# Patient Record
Sex: MALE | Race: WHITE
[De-identification: names, ages, dates, MRNs, and addresses within clinical notes are randomized per-mention and may not be internally consistent; named-entity substitution may affect disease eponyms.]

---

## 2021-05-20 ENCOUNTER — HOSPITAL ENCOUNTER (OUTPATIENT)
Dept: GENERAL RADIOLOGY | Facility: CLINIC | Age: 66
Discharge: HOME | End: 2021-05-22
Payer: COMMERCIAL

## 2021-05-20 DIAGNOSIS — R05.9 COUGH: ICD-10-CM

## 2021-05-20 PROCEDURE — 71046 X-RAY EXAM CHEST 2 VIEWS: CPT

## 2023-09-26 LAB
C REACTIVE PROTEIN (MG/L) IN SER/PLAS: 0.25 MG/DL
CITRULLINE ANTIBODY, IGG: <1 U/ML
CREATINE KINASE (U/L) IN SER/PLAS: 91 U/L (ref 0–325)
RHEUMATOID FACTOR (IU/ML) IN SERUM OR PLASMA: <10 IU/ML (ref 0–15)
SEDIMENTATION RATE, ERYTHROCYTE: 20 MM/H (ref 0–20)

## 2023-09-27 LAB
ANA PATTERN: ABNORMAL
ANA TITER: ABNORMAL
ANTI-CENTROMERE: <0.2 AI
ANTI-CHROMATIN: <0.2 AI
ANTI-DNA (DS): 1 IU/ML
ANTI-JO-1 IGG: <0.2 AI
ANTI-NUCLEAR ANTIBODY (ANA): POSITIVE
ANTI-RIBOSOMAL P: <0.2 AI
ANTI-RNP: <0.2 AI
ANTI-SCL-70: <0.2 AI
ANTI-SM/RNP: <0.2 AI
ANTI-SM: <0.2 AI
ANTI-SSA: <0.2 AI
ANTI-SSB: <0.2 AI

## 2023-09-29 LAB
ANCA IFA PATTERN: NORMAL
ANCA IFA TITER: NORMAL
ANGIOTENSIN CONVERTING ENZYME: 30 U/L (ref 16–85)
MYELOPEROXIDASE (MPO) AB, IGG: 0 AU/ML (ref 0–19)
SERINE PROTEINASE 3 (PR3) AB, IGG: 10 AU/ML (ref 0–19)

## 2023-10-02 LAB
ASPERGILLUS FUMIGATUS #1 AB: NORMAL
ASPERGILLUS FUMIGATUS #6 AB: NORMAL
AUREOBASIDIUM PULLULANS AB: NORMAL
MICROPOLYSPORA FAENI AB: NORMAL
PIGEON SERUM AB: NORMAL

## 2023-10-24 ENCOUNTER — ANESTHESIA EVENT (OUTPATIENT)
Dept: GASTROENTEROLOGY | Facility: EXTERNAL LOCATION | Age: 68
End: 2023-10-24

## 2023-10-24 PROBLEM — J84.10: Status: ACTIVE | Noted: 2021-01-01

## 2023-10-24 PROBLEM — R13.19 ESOPHAGEAL DYSPHAGIA: Status: ACTIVE | Noted: 2023-10-24

## 2023-10-24 PROBLEM — R06.00 DYSPNEA: Status: ACTIVE | Noted: 2023-10-24

## 2023-10-24 PROBLEM — J84.9 INTERSTITIAL LUNG DISEASE (MULTI): Status: ACTIVE | Noted: 2023-10-24

## 2023-10-24 PROBLEM — R05.3 CHRONIC COUGH: Status: ACTIVE | Noted: 2023-10-24

## 2023-10-24 PROBLEM — K21.9 ACID REFLUX: Status: ACTIVE | Noted: 2023-10-24

## 2023-10-24 RX ORDER — NEOMYCIN SULFATE, POLYMYXIN B SULFATE AND DEXAMETHASONE 3.5; 10000; 1 MG/ML; [USP'U]/ML; MG/ML
SUSPENSION/ DROPS OPHTHALMIC
COMMUNITY
Start: 2023-02-20 | End: 2023-11-02 | Stop reason: ALTCHOICE

## 2023-10-24 RX ORDER — ALBUTEROL SULFATE 90 UG/1
AEROSOL, METERED RESPIRATORY (INHALATION)
COMMUNITY
Start: 2021-11-12 | End: 2023-11-02 | Stop reason: ALTCHOICE

## 2023-10-24 RX ORDER — OMEPRAZOLE 20 MG/1
20 CAPSULE, DELAYED RELEASE ORAL EVERY OTHER DAY
COMMUNITY

## 2023-10-24 RX ORDER — ROSUVASTATIN CALCIUM 5 MG/1
TABLET, COATED ORAL
COMMUNITY
Start: 2022-04-04

## 2023-10-24 RX ORDER — ATENOLOL 25 MG/1
TABLET ORAL
COMMUNITY
Start: 2022-04-04 | End: 2023-11-02 | Stop reason: ALTCHOICE

## 2023-10-24 NOTE — ANESTHESIA PREPROCEDURE EVALUATION
Patient: Arya Weiss    Procedure Information       Date/Time: 10/30/23 1010    Scheduled providers: Catalino Oden MD    Procedure: EGD    Location: [facility]            Relevant Problems   GI   (+) Acid reflux       Clinical information reviewed:                   NPO Detail:  No data recorded     PHYSICAL EXAM    Anesthesia Plan

## 2023-10-25 ENCOUNTER — ANCILLARY PROCEDURE (OUTPATIENT)
Dept: CARDIAC REHAB | Facility: HOSPITAL | Age: 68
End: 2023-10-25
Payer: MEDICARE

## 2023-10-25 ENCOUNTER — APPOINTMENT (OUTPATIENT)
Dept: CARDIOLOGY | Facility: HOSPITAL | Age: 68
End: 2023-10-25
Payer: MEDICARE

## 2023-10-25 DIAGNOSIS — R06.00 DYSPNEA: ICD-10-CM

## 2023-10-25 DIAGNOSIS — R06.02 SHORTNESS OF BREATH: ICD-10-CM

## 2023-10-25 DIAGNOSIS — J84.9 INTERSTITIAL PULMONARY DISEASE, UNSPECIFIED (MULTI): ICD-10-CM

## 2023-10-25 PROCEDURE — 94621 CARDIOPULM EXERCISE TESTING: CPT

## 2023-10-25 PROCEDURE — 94621 CARDIOPULM EXERCISE TESTING: CPT | Performed by: INTERNAL MEDICINE

## 2023-10-30 ENCOUNTER — APPOINTMENT (OUTPATIENT)
Dept: GASTROENTEROLOGY | Facility: EXTERNAL LOCATION | Age: 68
End: 2023-10-30
Payer: MEDICARE

## 2023-10-30 ENCOUNTER — ANESTHESIA (OUTPATIENT)
Dept: GASTROENTEROLOGY | Facility: EXTERNAL LOCATION | Age: 68
End: 2023-10-30

## 2023-10-30 RX ORDER — SODIUM CHLORIDE, SODIUM LACTATE, POTASSIUM CHLORIDE, CALCIUM CHLORIDE 600; 310; 30; 20 MG/100ML; MG/100ML; MG/100ML; MG/100ML
20 INJECTION, SOLUTION INTRAVENOUS CONTINUOUS
Status: CANCELLED | OUTPATIENT
Start: 2023-10-30

## 2023-11-02 VITALS — BODY MASS INDEX: 24.34 KG/M2 | HEIGHT: 70 IN | WEIGHT: 170 LBS

## 2023-11-02 RX ORDER — MULTIVITAMIN
1 TABLET ORAL
COMMUNITY

## 2023-11-03 ENCOUNTER — ANESTHESIA EVENT (OUTPATIENT)
Dept: GASTROENTEROLOGY | Facility: HOSPITAL | Age: 68
End: 2023-11-03
Payer: MEDICARE

## 2023-11-03 ENCOUNTER — ANESTHESIA (OUTPATIENT)
Dept: GASTROENTEROLOGY | Facility: HOSPITAL | Age: 68
End: 2023-11-03
Payer: MEDICARE

## 2023-11-03 ENCOUNTER — HOSPITAL ENCOUNTER (OUTPATIENT)
Dept: GASTROENTEROLOGY | Facility: HOSPITAL | Age: 68
Setting detail: OUTPATIENT SURGERY
Discharge: HOME | End: 2023-11-03
Payer: MEDICARE

## 2023-11-03 VITALS
BODY MASS INDEX: 25.88 KG/M2 | SYSTOLIC BLOOD PRESSURE: 119 MMHG | OXYGEN SATURATION: 99 % | RESPIRATION RATE: 17 BRPM | HEART RATE: 55 BPM | TEMPERATURE: 97.3 F | WEIGHT: 180.34 LBS | DIASTOLIC BLOOD PRESSURE: 75 MMHG

## 2023-11-03 DIAGNOSIS — R13.19 OTHER DYSPHAGIA: ICD-10-CM

## 2023-11-03 DIAGNOSIS — K21.9 GASTRO-ESOPHAGEAL REFLUX DISEASE WITHOUT ESOPHAGITIS: ICD-10-CM

## 2023-11-03 PROBLEM — E78.5 HYPERLIPIDEMIA: Status: ACTIVE | Noted: 2023-11-03

## 2023-11-03 PROCEDURE — 7100000010 HC PHASE TWO TIME - EACH INCREMENTAL 1 MINUTE

## 2023-11-03 PROCEDURE — 7100000009 HC PHASE TWO TIME - INITIAL BASE CHARGE

## 2023-11-03 PROCEDURE — 3700000001 HC GENERAL ANESTHESIA TIME - INITIAL BASE CHARGE

## 2023-11-03 PROCEDURE — 2500000004 HC RX 250 GENERAL PHARMACY W/ HCPCS (ALT 636 FOR OP/ED): Performed by: NURSE ANESTHETIST, CERTIFIED REGISTERED

## 2023-11-03 PROCEDURE — 2500000005 HC RX 250 GENERAL PHARMACY W/O HCPCS: Performed by: NURSE ANESTHETIST, CERTIFIED REGISTERED

## 2023-11-03 PROCEDURE — 2500000004 HC RX 250 GENERAL PHARMACY W/ HCPCS (ALT 636 FOR OP/ED): Performed by: STUDENT IN AN ORGANIZED HEALTH CARE EDUCATION/TRAINING PROGRAM

## 2023-11-03 PROCEDURE — 88305 TISSUE EXAM BY PATHOLOGIST: CPT | Mod: TC,ELYLAB | Performed by: STUDENT IN AN ORGANIZED HEALTH CARE EDUCATION/TRAINING PROGRAM

## 2023-11-03 PROCEDURE — 3700000002 HC GENERAL ANESTHESIA TIME - EACH INCREMENTAL 1 MINUTE

## 2023-11-03 PROCEDURE — 43251 EGD REMOVE LESION SNARE: CPT | Performed by: STUDENT IN AN ORGANIZED HEALTH CARE EDUCATION/TRAINING PROGRAM

## 2023-11-03 PROCEDURE — 88305 TISSUE EXAM BY PATHOLOGIST: CPT | Mod: TC,SUR,ELYLAB | Performed by: STUDENT IN AN ORGANIZED HEALTH CARE EDUCATION/TRAINING PROGRAM

## 2023-11-03 PROCEDURE — 88305 TISSUE EXAM BY PATHOLOGIST: CPT | Performed by: STUDENT IN AN ORGANIZED HEALTH CARE EDUCATION/TRAINING PROGRAM

## 2023-11-03 PROCEDURE — 43239 EGD BIOPSY SINGLE/MULTIPLE: CPT | Performed by: STUDENT IN AN ORGANIZED HEALTH CARE EDUCATION/TRAINING PROGRAM

## 2023-11-03 RX ORDER — LIDOCAINE HYDROCHLORIDE 20 MG/ML
INJECTION, SOLUTION EPIDURAL; INFILTRATION; INTRACAUDAL; PERINEURAL AS NEEDED
Status: DISCONTINUED | OUTPATIENT
Start: 2023-11-03 | End: 2023-11-03

## 2023-11-03 RX ORDER — SODIUM CHLORIDE, SODIUM LACTATE, POTASSIUM CHLORIDE, CALCIUM CHLORIDE 600; 310; 30; 20 MG/100ML; MG/100ML; MG/100ML; MG/100ML
50 INJECTION, SOLUTION INTRAVENOUS CONTINUOUS
Status: DISCONTINUED | OUTPATIENT
Start: 2023-11-03 | End: 2023-11-04 | Stop reason: HOSPADM

## 2023-11-03 RX ORDER — PROPOFOL 10 MG/ML
INJECTION, EMULSION INTRAVENOUS AS NEEDED
Status: DISCONTINUED | OUTPATIENT
Start: 2023-11-03 | End: 2023-11-03

## 2023-11-03 RX ADMIN — PROPOFOL 50 MG: 10 INJECTION, EMULSION INTRAVENOUS at 08:26

## 2023-11-03 RX ADMIN — PROPOFOL 50 MG: 10 INJECTION, EMULSION INTRAVENOUS at 08:22

## 2023-11-03 RX ADMIN — SODIUM CHLORIDE, POTASSIUM CHLORIDE, SODIUM LACTATE AND CALCIUM CHLORIDE 50 ML/HR: 600; 310; 30; 20 INJECTION, SOLUTION INTRAVENOUS at 07:41

## 2023-11-03 RX ADMIN — PROPOFOL 50 MG: 10 INJECTION, EMULSION INTRAVENOUS at 08:34

## 2023-11-03 RX ADMIN — PROPOFOL 50 MG: 10 INJECTION, EMULSION INTRAVENOUS at 08:31

## 2023-11-03 RX ADMIN — PROPOFOL 50 MG: 10 INJECTION, EMULSION INTRAVENOUS at 08:29

## 2023-11-03 RX ADMIN — LIDOCAINE HYDROCHLORIDE 60 MG: 20 INJECTION, SOLUTION EPIDURAL; INFILTRATION; INTRACAUDAL; PERINEURAL at 08:20

## 2023-11-03 RX ADMIN — PROPOFOL 50 MG: 10 INJECTION, EMULSION INTRAVENOUS at 08:36

## 2023-11-03 RX ADMIN — PROPOFOL 100 MG: 10 INJECTION, EMULSION INTRAVENOUS at 08:20

## 2023-11-03 SDOH — HEALTH STABILITY: MENTAL HEALTH: CURRENT SMOKER: 0

## 2023-11-03 ASSESSMENT — PAIN SCALES - GENERAL
PAINLEVEL_OUTOF10: 0 - NO PAIN
PAINLEVEL_OUTOF10: 0 - NO PAIN
PAIN_LEVEL: 1
PAINLEVEL_OUTOF10: 0 - NO PAIN

## 2023-11-03 ASSESSMENT — COLUMBIA-SUICIDE SEVERITY RATING SCALE - C-SSRS
6. HAVE YOU EVER DONE ANYTHING, STARTED TO DO ANYTHING, OR PREPARED TO DO ANYTHING TO END YOUR LIFE?: NO
1. IN THE PAST MONTH, HAVE YOU WISHED YOU WERE DEAD OR WISHED YOU COULD GO TO SLEEP AND NOT WAKE UP?: NO
2. HAVE YOU ACTUALLY HAD ANY THOUGHTS OF KILLING YOURSELF?: NO

## 2023-11-03 ASSESSMENT — PAIN - FUNCTIONAL ASSESSMENT
PAIN_FUNCTIONAL_ASSESSMENT: 0-10
PAIN_FUNCTIONAL_ASSESSMENT: 0-10

## 2023-11-03 NOTE — H&P
Outpatient Hospital Procedure H&P    Patient Profile-Procedures  Name Arya Weiss  Date of Birth 1955  MRN [id number]  Address   [address]   [place] [id number]   [place] [address]    Primary Phone Number [phone]  Secondary Phone Number    Bekah Youngblood    Procedure(s):  Colonoscopy  Primary contact name and number   Extended Emergency Contact Information  Primary Emergency Contact: Deyanira Shore  Address: [address] [place] [address] [place]  Mobile Phone: [phone]  Relation: Significant Other    General Health  Weight   Vitals:    11/03/23 0724   Weight: 81.8 kg (180 lb 5.4 oz)     BMI Body mass index is 25.88 kg/m².    Allergies  No Known Allergies    Past Medical History   Past Medical History:   Diagnosis Date    COVID-19 vaccine administered     GERD (gastroesophageal reflux disease)     Hyperlipidemia     Larynx disease     possible obstruction or growth, patients pulmonary function test showed a possible obstruction causing respiratory changes    PONV (postoperative nausea and vomiting)     Pulmonary fibrosis (CMS/HCC)     PVC's (premature ventricular contractions)     SOB (shortness of breath) on exertion        Provider assessment  Diagnosis: GERD, dysphagia.  Medication Reviewed - yes  Prior to Admission medications    Medication Sig Start Date End Date Taking? Authorizing Provider   multivitamin tablet Take 1 tablet by mouth.    Historical Provider, MD   omeprazole (PriLOSEC) 20 mg DR capsule Take 1 capsule (20 mg) by mouth every other day. Do not crush or chew.    Historical Provider, MD   rosuvastatin (Crestor) 5 mg tablet Rosuvastatin Calcium 5 MG Oral Tablet   Quantity: 90  Refills: 0        Start : 4-Apr-2022   Active 4/4/22   Historical Provider, MD   albuterol 90 mcg/actuation inhaler Albuterol Sulfate  (90 Base) MCG/ACT Inhalation Aerosol Solution   Quantity: 8  Refills: 0        Start : 12-Nov-2021   Active 11/12/21 11/2/23  Historical  Provider, MD   atenolol (Tenormin) 25 mg tablet Atenolol 25 MG Oral Tablet   Quantity: 45  Refills: 0        Start : 4-Apr-2022   Active 4/4/22 11/2/23  Historical Provider, MD   neomycin-polymyxin-dexAMETHasone (Maxitrol) 3.5mg/mL-10,000 unit/mL-0.1 % ophthalmic suspension  2/20/23 11/2/23  Historical Provider, MD       Physical Exam  Vitals:    11/03/23 0724   BP: 148/83   Pulse: 63   Resp: 18   Temp: 36.1 °C (97 °F)   SpO2: 97%        General: A&Ox3, NAD.  HEENT: AT/NC.   CV: RRR. No murmur.  Resp: CTA bilaterally. No wheezing, rhonchi or rales.   GI: Soft, NT/ND.  Extrem: No edema. Pulses intact.  Skin: No Jaundice.   Neuro: No focal deficits.   Psych: Normal mood and affect.      Sedation Plan: Deep Sedation.  Procedure Plan - pre-procedural (re)assesment completed by physician:  discharge/transfer patient when discharge criteria met    Kwan Quintana DO  11/3/2023 8:12 AM

## 2023-11-03 NOTE — NURSING NOTE
Huddle and Timeout completed together with team. Patient wristband and SHAMIR information verified.

## 2023-11-03 NOTE — ANESTHESIA PREPROCEDURE EVALUATION
"Arya Weiss is a 68 y.o. adult here for:    EGD  With Kwan Quintana, DO  Other dysphagia  Gastro-esophageal reflux disease without esophagitis    Relevant Problems   Cardiovascular   (+) Hyperlipidemia      GI   (+) Acid reflux       No results found for: \"HGB\", \"HCT\", \"WBC\", \"PLT\", \"GLU\", \"NA\", \"K\", \"CL\", \"CREATININE\", \"BUN\"    Stress test 9/2023:   Impression:      1. The peak VO2 achieved was 34.4 ml/kg/min which is consistent with Jung functional class A (mild- no exercise impairment). The peak VO2 achieved was 120% peak predicted VO2 based on age, gender, height nomogram.   2. There was a good exercise effort with peak RER 1.04 and peak  bpm (100% APMHR).   3. The ventilatory effiency slope (VE/VCO2) at peak exercise was mildly abnormal (32).   4. The cardiac response to exercise showed normal ECG tracings without coronary ischemia. The O2 pulse augmented appropriately from 6 ml/beat at rest to 18 ml/beat at peak exercise.   5. The pulmonary response to exercise showed breathing reserve 33% and normal oxygenation during exercise.   6. Spirometry: Pre-exercise: FVC 4.24 L (97% predicted), FEV1 3.44 L (103% pred), FEV1/FVC 81% (106% pred); Post-exercise: FVC 4.27 L (0% change from baseline), FEV1 3.49 L (+1 change), FEV1/FVC ratio 82%. No change in exercies spirometry.   7. The ventilatory threshold occurred at 98% peak VO2 (33.6 ml/kg/min). The peak VO2 at VT was approximately 9.5 METs indicating an above normal submaximal exertional thershold.   8. Conclusion: Supranormal exercise capacity with peak VO2 at 120% of peak predicted, 34.4 ml/kg/min.    TTE 8/2023:  CONCLUSIONS:  1. Left ventricular systolic function is normal with a 60-65% estimated ejection fraction.  2. There is no evidence of mitral valve stenosis.  3. No evidence of mitral valve regurgitation.  4. No evidence of tricuspid regurgitation.  5. Aortic valve stenosis is not present.  6. The main pulmonary artery is normal in size, " and position, with normal bifurcation into the left and right pulmonary arteries.    Social History     Tobacco Use   Smoking Status Not on file   Smokeless Tobacco Not on file       No Known Allergies    Current Outpatient Medications   Medication Instructions    multivitamin tablet 1 tablet, oral    omeprazole (PRILOSEC) 20 mg, oral, Every other day, Do not crush or chew.    rosuvastatin (Crestor) 5 mg tablet Rosuvastatin Calcium 5 MG Oral Tablet   Quantity: 90  Refills: 0        Start : 4-Apr-2022   Active       Past Surgical History:   Procedure Laterality Date    DENTAL IMPLANT      KNEE CARTILAGE SURGERY Right     LAMINECTOMY      L4-L5    SHOULDER SURGERY Left     TOE SURGERY Left     great toe    VASECTOMY         Family History   Family history unknown: Yes       NPO Details:  No data recorded    Physical Exam    Airway  Mallampati: II  TM distance: >3 FB  Neck ROM: full     Cardiovascular    Dental - normal exam     Pulmonary    Abdominal            Anesthesia Plan    ASA 2     MAC     The patient is not a current smoker.    intravenous induction   Anesthetic plan and risks discussed with patient.    Plan discussed with CRNA.

## 2023-11-03 NOTE — ANESTHESIA POSTPROCEDURE EVALUATION
Patient: Arya Weiss    Procedure Summary       Date: 11/03/23 Room / Location: [facility]    Anesthesia Start: 0814 Anesthesia Stop: 0842    Procedure: EGD Diagnosis:       Other dysphagia      Gastro-esophageal reflux disease without esophagitis    Scheduled Providers: Kwan Quintana DO; Hero Moulton MD Responsible Provider: Hero Moulton MD    Anesthesia Type: MAC ASA Status: 2            Anesthesia Type: MAC    Vitals Value Taken Time   BP 92/50 11/03/23 0843   Temp 36 11/03/23 0843   Pulse 60 11/03/23 0843   Resp 18 11/03/23 0843   SpO2 99 11/03/23 0843       Anesthesia Post Evaluation    Patient location during evaluation: bedside  Patient participation: complete - patient participated  Level of consciousness: awake  Pain score: 1  Pain management: adequate  Airway patency: patent  Cardiovascular status: acceptable  Respiratory status: acceptable  Hydration status: acceptable        There were no known notable events for this encounter.

## 2023-11-03 NOTE — NURSING NOTE
Patient tolerated procedure well. Appears comfortable with no complaints of pain. VS stable. Arousable prior to transport. Patient transported to [facility] via cart.  Report called per CRNA.  Handoff completed.

## 2023-11-03 NOTE — DISCHARGE INSTRUCTIONS
Upper GI Endoscopy Discharge Instructions    About this topic  This procedure is done to view your upper gastrointestinal (GI) tract. This includes your throat and food pipe (esophagus). It also includes your stomach and the first part of the small bowel. Some people have this test for problems like coughing or throwing up blood. Other people may be having bad belly pain or blood in their stool. You may be having trouble swallowing or problems with acid reflux.  Doctors often use this test to look for problems like:  Ulcers  Cancer or tumor growths  Internal bleeding  Swelling  Inflammation  Infection  Driscoll's esophagus  Gastroesophageal reflux disease or GERD  Swallowing problems    What care is needed at home?  Ask your doctor what you need to do when you go home. Make sure you ask questions if you do not understand what the doctor says. This way you will know what you need to do.  Your throat may feel sore. Your belly may also feel bloated. Your doctor may give you drugs to help with pain.  Talk to your doctor about when it is safe for you to go back to eating your normal diet and taking your drugs. You can drink fluid once the numbing drugs in your throat wear off.  What follow-up care is needed?  Your doctor may ask you to make visits to the office to check on your progress. Be sure to keep these visits.  The results of this test may help your doctor understand what kind of problem you have with your upper GI tract. Together you can make a plan for more care.  What drugs may be needed?  The doctor may order drugs to:  Help with pain  Decrease the acid in your stomach  Will physical activity be limited?  You may need to limit your activity for the rest of the day. After that, you will likely be able to go back to your normal activities.  What changes to diet are needed?  Soft foods like pudding or soups may be easier to eat at first. Ask your doctor if you need to make any changes to your diet.  What problems  could happen?  Painful swallowing  Upset stomach  Injury to food pipe  Throwing up  Tear in the esophagus  When do I need to call the doctor?  Signs of infection. These include a fever of 100.4°F (38°C) or higher, chills.  Very bad belly pain  Throwing up blood  Your belly is hard and swollen  Trouble swallowing or breathing  Upset stomach and throwing up  Bloody or black tarry stools  Cough, shortness of breath, or chest pain  A crunching feeling under the skin in your neck  You are not feeling better in 2 to 3 days or you are feeling worse  Teach Back: Helping You Understand  The Teach Back Method helps you understand the information we are giving you. After you talk with the staff, tell them in your own words what you learned. This helps to make sure the staff has described each thing clearly. It also helps to explain things that may have been confusing. Before going home, make sure you can do these:  I can tell you about my procedure.  I can tell you what changes I need to make with my diet or drugs.  I can tell you what I will do if I have very bad belly pain, throwing up blood, or my belly is hard and swollen.  Where can I learn more?  [employer]  https://www.gastro.org/practice-guidance/gi-patient-center/topic/upper-gi-endoscopy      Moderate Sedation in Adults Discharge Instructions    About this topic  Moderate sedation is also known as conscious sedation. It changes your state of being awake or consciousness. With this sedation, you may feel slight pain or pressure during a procedure. The drugs help you to relax and may even allow you to sleep. It will be easy to wake you and you may talk and answer questions while under sedation. Most likely, you will not remember what happens while under this sedation.  What care is needed at home?  Ask your doctor what you need to do when you go home. Make sure you understand everything the doctor says. This way you will know what you need  to do.  You will not be allowed to drive right away after the procedure. Ask a family member or a friend to drive you home.  Do not operate heavy or dangerous machinery for at least 24 hours.  Do not make major decisions or sign important papers for at least 24 hours. You may not be thinking clearly.  Avoid beer, wine, or mixed drinks (alcohol) for at least 24 hours.  You are at a higher risk of falling for at least 24 hours after moderate sedation.  Take extra care when you get up.  Do not change positions quickly.  Do not rush when you need to go to the bathroom or to answer the phone.  Ask for help if you feel unsteady when you try to walk.  Wear shoes with non-slip soles and low heels.  What follow-up care is needed?  Your doctor may ask you to make visits to the office to check on your progress. Be sure to keep these visits. Your doctor may also refer you to other doctors or tell you that you need more tests or care.  What drugs may be needed?  The doctor may order drugs to:  Help with pain  Treat an upset stomach or throwing up  Will physical activity be limited?  Rest for the day of the procedure. Avoid strenuous activities like heavy lifting and hard exercise. Talk to your doctor about whether you need to limit lifting or exercise after your procedure.  What changes to diet are needed?  Start with a light diet when you are fully awake. This includes things that are easy to swallow like soups, pudding, jello, toast, and eggs. Slowly progress to your normal diet.  What problems could happen?  Low blood pressure  Breathing problems  Upset stomach or throwing up  Dizziness  When do I need to call the doctor?  Feel dizzy, weak, or tired  Faint  Very bad headache  Upset stomach or throwing up  To follow up for more tests or care  Teach Back: Helping You Understand  The Teach Back Method helps you understand the information we are giving you. After you talk with the staff, tell them in your own words what you  learned. This helps to make sure the staff has described each thing clearly. It also helps to explain things that may have been confusing. Before going home, make sure you can do these:  I can tell you about my procedure.  I can tell you if I need more tests or care.  I can tell you what is good for me to eat and drink the next day.  I can tell you what I would do if I feel dizzy, weak, or tired.  Last Reviewed Date  2020-03-02

## 2023-11-15 LAB
LABORATORY COMMENT REPORT: NORMAL
PATH REPORT.FINAL DX SPEC: NORMAL
PATH REPORT.GROSS SPEC: NORMAL
PATH REPORT.TOTAL CANCER: NORMAL

## 2023-12-05 ENCOUNTER — HOSPITAL ENCOUNTER
Dept: HOSPITAL 101 - RAD | Age: 68
Discharge: HOME | End: 2023-12-05
Payer: MEDICARE

## 2023-12-05 DIAGNOSIS — M79.672: Primary | ICD-10-CM

## 2023-12-05 PROCEDURE — 73630 X-RAY EXAM OF FOOT: CPT

## 2023-12-05 NOTE — XR_ITS
The [address] [address] 
     [phone] 
  
  
Patient Name: 
NICOLE CHURCH 
  
MRN: [id number]    YOB: 1955    Sex: M 
Assigned Patient Location: RAD 
Current Patient Location:   
Accession/Order Number: [id number] 
Exam Date: 12/05/2023  13:45    Report Date: 12/06/2023  01:41 
  
At the request of: 
ABENA BONILLA   
  
Procedure:  XR foot LT min 3V 
  
PROCEDURE: XR foot LT min 3V 
  
HISTORY: LEFT FOOT PAIN , chronic 
  
COMPARISON: None. 
  
FINDINGS: 
BONES:No fracture, acute abnormality, or significant arthropathy.  
SOFT TISSUES:No visible soft tissue swelling.  
EFFUSION:None visible.  
OTHER: Negative.  
  
ORDER #: [id number] XR/XR foot LT min 3V  
IMPRESSION:  
   
1. No acute abnormality or significant degenerative changes.  
2. Minimal degenerative enthesopathic spurring of the calcaneus.  
   
   
Electronically authenticated by: AURA VICKERS   Date: 12/06/2023  01:41

## 2024-02-19 ENCOUNTER — DOCUMENTATION (OUTPATIENT)
Dept: PULMONOLOGY | Facility: HOSPITAL | Age: 69
End: 2024-02-19
Payer: MEDICARE

## 2024-02-19 ENCOUNTER — TELEPHONE (OUTPATIENT)
Dept: PULMONOLOGY | Facility: CLINIC | Age: 69
End: 2024-02-19
Payer: MEDICARE

## 2024-02-19 DIAGNOSIS — R05.3 CHRONIC COUGH: Primary | ICD-10-CM

## 2024-02-19 RX ORDER — PREDNISONE 10 MG/1
TABLET ORAL
Qty: 30 TABLET | Refills: 0 | Status: SHIPPED | OUTPATIENT
Start: 2024-02-19 | End: 2024-03-14 | Stop reason: WASHOUT

## 2024-02-19 NOTE — PROGRESS NOTES
Patient came down with COVID on January 24, 2024.  He was treated with Paxlovid.  He is having coughing particular at night.  He did see his primary physician outside of the  system and the chest x-ray was stable with a chronic scarring without pneumonia.  It is not unreasonable to put him on some prednisone and if no improvement he will come in to see me.  He also had received some amoxicillin.

## 2024-02-19 NOTE — TELEPHONE ENCOUNTER
Patient sts he still has some unresolved post COVID issues that he would like to discuss with the doctor. Please call

## 2024-02-24 ENCOUNTER — APPOINTMENT (OUTPATIENT)
Dept: RADIOLOGY | Facility: HOSPITAL | Age: 69
End: 2024-02-24
Payer: MEDICARE

## 2024-02-24 ENCOUNTER — HOSPITAL ENCOUNTER (EMERGENCY)
Facility: HOSPITAL | Age: 69
Discharge: HOME | End: 2024-02-25
Payer: MEDICARE

## 2024-02-24 ENCOUNTER — APPOINTMENT (OUTPATIENT)
Dept: CARDIOLOGY | Facility: HOSPITAL | Age: 69
End: 2024-02-24
Payer: MEDICARE

## 2024-02-24 DIAGNOSIS — J84.10 PULMONARY FIBROSIS (MULTI): ICD-10-CM

## 2024-02-24 DIAGNOSIS — R91.1 PULMONARY NODULE: ICD-10-CM

## 2024-02-24 DIAGNOSIS — R05.1 ACUTE COUGH: Primary | ICD-10-CM

## 2024-02-24 LAB
ALBUMIN SERPL BCP-MCNC: 4.1 G/DL (ref 3.4–5)
ALP SERPL-CCNC: 73 U/L (ref 33–136)
ALT SERPL W P-5'-P-CCNC: 22 U/L (ref 7–52)
ANION GAP SERPL CALC-SCNC: 15 MMOL/L (ref 10–20)
AST SERPL W P-5'-P-CCNC: 16 U/L (ref 9–39)
BASOPHILS # BLD AUTO: 0.02 X10*3/UL (ref 0–0.1)
BASOPHILS NFR BLD AUTO: 0.2 %
BILIRUB SERPL-MCNC: 0.3 MG/DL (ref 0–1.2)
BNP SERPL-MCNC: 16 PG/ML (ref 0–99)
BUN SERPL-MCNC: 14 MG/DL (ref 6–23)
CALCIUM SERPL-MCNC: 9.3 MG/DL (ref 8.6–10.3)
CHLORIDE SERPL-SCNC: 103 MMOL/L (ref 98–107)
CO2 SERPL-SCNC: 26 MMOL/L (ref 21–32)
CREAT SERPL-MCNC: 0.75 MG/DL (ref 0.5–1.3)
EGFRCR SERPLBLD CKD-EPI 2021: >90 ML/MIN/1.73M*2
EOSINOPHIL # BLD AUTO: 0.01 X10*3/UL (ref 0–0.7)
EOSINOPHIL NFR BLD AUTO: 0.1 %
ERYTHROCYTE [DISTWIDTH] IN BLOOD BY AUTOMATED COUNT: 12.5 % (ref 11.5–14.5)
GLUCOSE SERPL-MCNC: 96 MG/DL (ref 74–99)
HCT VFR BLD AUTO: 40.7 % (ref 36–52)
HGB BLD-MCNC: 13.9 G/DL (ref 12–17.5)
IMM GRANULOCYTES # BLD AUTO: 0.03 X10*3/UL (ref 0–0.7)
IMM GRANULOCYTES NFR BLD AUTO: 0.3 % (ref 0–0.9)
LYMPHOCYTES # BLD AUTO: 1.27 X10*3/UL (ref 1.2–4.8)
LYMPHOCYTES NFR BLD AUTO: 12.7 %
MCH RBC QN AUTO: 30.7 PG (ref 26–34)
MCHC RBC AUTO-ENTMCNC: 34.2 G/DL (ref 32–36)
MCV RBC AUTO: 90 FL (ref 80–100)
MONOCYTES # BLD AUTO: 0.68 X10*3/UL (ref 0.1–1)
MONOCYTES NFR BLD AUTO: 6.8 %
NEUTROPHILS # BLD AUTO: 8 X10*3/UL (ref 1.2–7.7)
NEUTROPHILS NFR BLD AUTO: 79.9 %
NRBC BLD-RTO: 0 /100 WBCS (ref 0–0)
PLATELET # BLD AUTO: 340 X10*3/UL (ref 150–450)
POTASSIUM SERPL-SCNC: 4 MMOL/L (ref 3.5–5.3)
PROT SERPL-MCNC: 7.2 G/DL (ref 6.4–8.2)
RBC # BLD AUTO: 4.53 X10*6/UL (ref 4–5.9)
SODIUM SERPL-SCNC: 140 MMOL/L (ref 136–145)
WBC # BLD AUTO: 10 X10*3/UL (ref 4.4–11.3)

## 2024-02-24 PROCEDURE — 36415 COLL VENOUS BLD VENIPUNCTURE: CPT | Performed by: PHYSICIAN ASSISTANT

## 2024-02-24 PROCEDURE — 99285 EMERGENCY DEPT VISIT HI MDM: CPT | Mod: 25

## 2024-02-24 PROCEDURE — 93005 ELECTROCARDIOGRAM TRACING: CPT

## 2024-02-24 PROCEDURE — 2500000004 HC RX 250 GENERAL PHARMACY W/ HCPCS (ALT 636 FOR OP/ED): Performed by: PHYSICIAN ASSISTANT

## 2024-02-24 PROCEDURE — 80053 COMPREHEN METABOLIC PANEL: CPT | Performed by: PHYSICIAN ASSISTANT

## 2024-02-24 PROCEDURE — 96361 HYDRATE IV INFUSION ADD-ON: CPT

## 2024-02-24 PROCEDURE — 71275 CT ANGIOGRAPHY CHEST: CPT

## 2024-02-24 PROCEDURE — 96374 THER/PROPH/DIAG INJ IV PUSH: CPT

## 2024-02-24 PROCEDURE — 2550000001 HC RX 255 CONTRASTS: Performed by: PHYSICIAN ASSISTANT

## 2024-02-24 PROCEDURE — 85025 COMPLETE CBC W/AUTO DIFF WBC: CPT | Performed by: PHYSICIAN ASSISTANT

## 2024-02-24 PROCEDURE — 71275 CT ANGIOGRAPHY CHEST: CPT | Performed by: RADIOLOGY

## 2024-02-24 PROCEDURE — 83880 ASSAY OF NATRIURETIC PEPTIDE: CPT | Performed by: PHYSICIAN ASSISTANT

## 2024-02-24 RX ORDER — IPRATROPIUM BROMIDE AND ALBUTEROL SULFATE 2.5; .5 MG/3ML; MG/3ML
3 SOLUTION RESPIRATORY (INHALATION) EVERY 20 MIN
Status: ACTIVE | OUTPATIENT
Start: 2024-02-24 | End: 2024-02-24

## 2024-02-24 RX ORDER — MAGNESIUM SULFATE HEPTAHYDRATE 40 MG/ML
2 INJECTION, SOLUTION INTRAVENOUS ONCE
Status: COMPLETED | OUTPATIENT
Start: 2024-02-24 | End: 2024-02-24

## 2024-02-24 RX ADMIN — IOHEXOL 75 ML: 350 INJECTION, SOLUTION INTRAVENOUS at 23:26

## 2024-02-24 RX ADMIN — SODIUM CHLORIDE, POTASSIUM CHLORIDE, SODIUM LACTATE AND CALCIUM CHLORIDE 1000 ML: 600; 310; 30; 20 INJECTION, SOLUTION INTRAVENOUS at 22:07

## 2024-02-24 RX ADMIN — METHYLPREDNISOLONE SODIUM SUCCINATE 125 MG: 125 INJECTION, POWDER, FOR SOLUTION INTRAMUSCULAR; INTRAVENOUS at 22:11

## 2024-02-24 RX ADMIN — MAGNESIUM SULFATE HEPTAHYDRATE 2 G: 40 INJECTION, SOLUTION INTRAVENOUS at 22:11

## 2024-02-24 ASSESSMENT — COLUMBIA-SUICIDE SEVERITY RATING SCALE - C-SSRS
2. HAVE YOU ACTUALLY HAD ANY THOUGHTS OF KILLING YOURSELF?: NO
1. IN THE PAST MONTH, HAVE YOU WISHED YOU WERE DEAD OR WISHED YOU COULD GO TO SLEEP AND NOT WAKE UP?: NO
6. HAVE YOU EVER DONE ANYTHING, STARTED TO DO ANYTHING, OR PREPARED TO DO ANYTHING TO END YOUR LIFE?: NO

## 2024-02-24 ASSESSMENT — PAIN DESCRIPTION - ONSET: ONSET: ONGOING

## 2024-02-24 ASSESSMENT — PAIN DESCRIPTION - PAIN TYPE: TYPE: ACUTE PAIN

## 2024-02-24 ASSESSMENT — PAIN SCALES - GENERAL: PAINLEVEL_OUTOF10: 2

## 2024-02-24 ASSESSMENT — LIFESTYLE VARIABLES
HAVE PEOPLE ANNOYED YOU BY CRITICIZING YOUR DRINKING: NO
HAVE YOU EVER FELT YOU SHOULD CUT DOWN ON YOUR DRINKING: NO
EVER HAD A DRINK FIRST THING IN THE MORNING TO STEADY YOUR NERVES TO GET RID OF A HANGOVER: NO
EVER FELT BAD OR GUILTY ABOUT YOUR DRINKING: NO

## 2024-02-24 ASSESSMENT — PAIN DESCRIPTION - PROGRESSION: CLINICAL_PROGRESSION: NOT CHANGED

## 2024-02-24 ASSESSMENT — PAIN DESCRIPTION - FREQUENCY: FREQUENCY: CONSTANT/CONTINUOUS

## 2024-02-24 ASSESSMENT — PAIN - FUNCTIONAL ASSESSMENT: PAIN_FUNCTIONAL_ASSESSMENT: 0-10

## 2024-02-24 ASSESSMENT — PAIN DESCRIPTION - LOCATION: LOCATION: CHEST

## 2024-02-24 ASSESSMENT — PAIN DESCRIPTION - DESCRIPTORS: DESCRIPTORS: TIGHTNESS;DISCOMFORT

## 2024-02-25 VITALS
TEMPERATURE: 97.7 F | SYSTOLIC BLOOD PRESSURE: 150 MMHG | RESPIRATION RATE: 18 BRPM | HEART RATE: 75 BPM | HEIGHT: 70 IN | OXYGEN SATURATION: 96 % | BODY MASS INDEX: 25.77 KG/M2 | DIASTOLIC BLOOD PRESSURE: 74 MMHG | WEIGHT: 180 LBS

## 2024-02-25 LAB
ATRIAL RATE: 74 BPM
P AXIS: 36 DEGREES
P OFFSET: 189 MS
P ONSET: 133 MS
PR INTERVAL: 166 MS
Q ONSET: 216 MS
QRS COUNT: 12 BEATS
QRS DURATION: 92 MS
QT INTERVAL: 390 MS
QTC CALCULATION(BAZETT): 432 MS
QTC FREDERICIA: 418 MS
R AXIS: 40 DEGREES
T AXIS: 11 DEGREES
T OFFSET: 411 MS
VENTRICULAR RATE: 74 BPM

## 2024-02-25 PROCEDURE — 2500000002 HC RX 250 W HCPCS SELF ADMINISTERED DRUGS (ALT 637 FOR MEDICARE OP, ALT 636 FOR OP/ED): Mod: MUE | Performed by: PHYSICIAN ASSISTANT

## 2024-02-25 PROCEDURE — 94640 AIRWAY INHALATION TREATMENT: CPT | Mod: MUE

## 2024-02-25 RX ADMIN — IPRATROPIUM BROMIDE AND ALBUTEROL SULFATE 3 ML: 2.5; .5 SOLUTION RESPIRATORY (INHALATION) at 00:08

## 2024-02-25 RX ADMIN — IPRATROPIUM BROMIDE AND ALBUTEROL SULFATE 3 ML: 2.5; .5 SOLUTION RESPIRATORY (INHALATION) at 00:04

## 2024-02-25 NOTE — ED PROVIDER NOTES
HPI   Chief Complaint   Patient presents with   • Cough     Pt was diagnosed with COVID a month ago and has been having continuous symptoms since. Pt has a persistent cough and feels short of breath at times.        This is a pleasant 68-year-old gentleman with a history of pulmonary fibrosis who presents to the emergency department chief complaint of persistent cough with dyspnea.  Patient states he was diagnosed with COVID a month ago and was treated with Paxlovid.  He developed persistent dry cough most notable at night.  He was seen by his doctor 2 weeks ago was placed on amoxicillin.  Patient reports that he was also placed on prednisone and his symptoms have not really improved.  He notes for the past few days the cough has been getting more progressive keeping him up at night.  He has been using his inhaler as well as his nebulizer.  He does see a pulmonologist.  Patient denies hemoptysis, fever, chills, night sweats, diaphoresis, back pain, abdominal pain, nausea vomiting or diarrhea.  He has a past medical history of pulmonary fibrosis, GERD, chronic cough, esophageal dysphagia, interstitial lung disease, hyperlipidemia.      History provided by:  Patient, spouse and medical records                      No data recorded                   Patient History   Past Medical History:   Diagnosis Date   • COVID-19 vaccine administered    • GERD (gastroesophageal reflux disease)    • Hyperlipidemia    • Larynx disease     possible obstruction or growth, patients pulmonary function test showed a possible obstruction causing respiratory changes   • PONV (postoperative nausea and vomiting)    • Pulmonary fibrosis (CMS/HCC)    • PVC's (premature ventricular contractions)    • SOB (shortness of breath) on exertion      Past Surgical History:   Procedure Laterality Date   • DENTAL IMPLANT     • KNEE CARTILAGE SURGERY Right    • LAMINECTOMY      L4-L5   • SHOULDER SURGERY Left    • TOE SURGERY Left     great toe   •  VASECTOMY       Family History   Family history unknown: Yes     Social History     Tobacco Use   • Smoking status: Never     Passive exposure: Never   • Smokeless tobacco: Never   Substance Use Topics   • Alcohol use: Not on file   • Drug use: Not on file       Physical Exam   ED Triage Vitals [02/24/24 1923]   Temperature Heart Rate Respirations BP   36.4 °C (97.5 °F) 81 20 164/85      Pulse Ox Temp Source Heart Rate Source Patient Position   95 % Temporal Monitor Sitting      BP Location FiO2 (%)     Right arm --       Physical Exam  Vitals and nursing note reviewed.   Constitutional:       Appearance: Normal appearance. Arya Weiss is normal weight.   HENT:      Head: Normocephalic and atraumatic.      Right Ear: Tympanic membrane, ear canal and external ear normal.      Left Ear: Tympanic membrane, ear canal and external ear normal.      Nose: Nose normal.      Mouth/Throat:      Mouth: Mucous membranes are moist.      Pharynx: Oropharynx is clear.   Eyes:      Extraocular Movements: Extraocular movements intact.      Conjunctiva/sclera: Conjunctivae normal.      Pupils: Pupils are equal, round, and reactive to light.   Cardiovascular:      Rate and Rhythm: Normal rate and regular rhythm.      Pulses: Normal pulses.      Heart sounds: Normal heart sounds.   Pulmonary:      Effort: Pulmonary effort is normal.      Breath sounds: Normal air entry. Examination of the right-upper field reveals decreased breath sounds and wheezing. Examination of the left-upper field reveals decreased breath sounds and wheezing. Examination of the right-middle field reveals decreased breath sounds and wheezing. Examination of the left-middle field reveals decreased breath sounds and wheezing. Examination of the right-lower field reveals decreased breath sounds and wheezing. Examination of the left-lower field reveals decreased breath sounds and wheezing. Decreased breath sounds and wheezing present. No rhonchi or rales.    Abdominal:      General: Abdomen is flat. Bowel sounds are normal.      Palpations: Abdomen is soft. There is no mass.      Tenderness: There is no abdominal tenderness. There is no guarding.   Musculoskeletal:         General: No swelling or tenderness. Normal range of motion.      Cervical back: Normal range of motion and neck supple.   Skin:     General: Skin is warm and dry.      Capillary Refill: Capillary refill takes less than 2 seconds.      Findings: No rash.   Neurological:      General: No focal deficit present.      Mental Status: Arya Weiss is alert and oriented to person, place, and time. Mental status is at baseline.   Psychiatric:         Mood and Affect: Mood normal.         Behavior: Behavior normal.         Thought Content: Thought content normal.         Judgment: Judgment normal.         ED Course & MDM   Diagnoses as of 02/25/24 0123   Acute cough   Pulmonary fibrosis (CMS/HCC)   Pulmonary nodule       Medical Decision Making  ED course Temp 36.4, heart rate 81, respirations 20, BP is 164/85, pulse ox was 95% on room air weight 81.6 kg  I discussed the workup plan with the patient which includes lab work, CT scan of the chest.  He was given a liter of LR wide open, 3 albuterol aerosol treatments, Solu-Medrol 120 mg IV push 2 g magnesium sulfate IV piggyback.    EKG shows normal sinus rhythm rate 74 NM was 166 QRS is 92  QTc was 432 no ischemic changes  Patient's lab work was reviewed CBC white count 10 hemoglobin 13 point hematocrit 40.7 platelet count was 340, there was a neutrophils in his differential his comprehensive metabolic panel is unremarkable his BNP was 16, CT scan of the chest shows no evidence of PE, he has bilateral groundglass opacities which may be secondary to atelectasis or pneumonia, redemonstration of changes of interstitial lung disease mild bronchiectasis, there is also a 4 mm pleural-based nodule to the left lower lobe and a 7 mm nodule density in the right  major fissure unchanged from previous imaging and evidence of cardiomegaly.  No evidence of pleural effusion or pneumothorax.  Examination patient states he is feeling much better after the aerosol treatments steroids and magnesium.  Repeat exam is wheezing has improved.  I discussed results of the workup.  At this point time he defers any further medications or prescriptions and will follow-up with his pulmonologist this week.  He was encouraged to return with any concerns or worsening of symptoms all questions answered prior to discharge        Procedure  Procedures     Edwin Guo PA-C  02/25/24 0123

## 2024-03-13 ENCOUNTER — TELEPHONE (OUTPATIENT)
Dept: PULMONOLOGY | Facility: CLINIC | Age: 69
End: 2024-03-13
Payer: MEDICARE

## 2024-03-13 ENCOUNTER — DOCUMENTATION (OUTPATIENT)
Dept: PULMONOLOGY | Facility: HOSPITAL | Age: 69
End: 2024-03-13
Payer: MEDICARE

## 2024-03-13 NOTE — PROGRESS NOTES
Patient developed COVID several months ago with increasing coughing congestion or fever.  He was not hospitalized.  Because of increasing coughing congestion he went to the emergency room the end of February and the CT scan revealed groundglass changes at the bases.  These changes are new compared to his HRCT scan last August.  I suspect that he developed COVID-pneumonia.  He has been treated with varying doses of antibiotics and corticosteroids.  He was a started on 60 mg of prednisone around 3 days ago and is tapering it.  He is on doxycycline.  I told him to stay on the 40 mg a day until he sees me the first part of next week.  He will continue with albuterol and monitor his oxygen level.

## 2024-03-13 NOTE — TELEPHONE ENCOUNTER
Patient sts he has been sick for the past 7 weeks and is requesting an appt. At this point I don't have anywhere else to squeeze anyone.    Any suggestions?

## 2024-03-14 ENCOUNTER — TELEPHONE (OUTPATIENT)
Dept: PULMONOLOGY | Facility: CLINIC | Age: 69
End: 2024-03-14
Payer: MEDICARE

## 2024-03-14 DIAGNOSIS — J84.10 PULMONARY FIBROSIS DETERMINED BY HIGH RESOLUTION COMPUTED TOMOGRAPHY (MULTI): ICD-10-CM

## 2024-03-14 DIAGNOSIS — R05.3 CHRONIC COUGH: Primary | ICD-10-CM

## 2024-03-14 DIAGNOSIS — U09.9 POST COVID-19 CONDITION, UNSPECIFIED: ICD-10-CM

## 2024-03-14 RX ORDER — PREDNISONE 20 MG/1
TABLET ORAL
Qty: 6 TABLET | Refills: 0 | Status: SHIPPED | OUTPATIENT
Start: 2024-03-14 | End: 2024-03-19 | Stop reason: WASHOUT

## 2024-03-14 NOTE — TELEPHONE ENCOUNTER
Mr. Weiss is requesting a full script for prednisone or just 6 more pills. States he do not have enough to get him through until his appointment on Tuesday 3/19.  Can be sent to [facility] pharmacy on file.

## 2024-03-19 ENCOUNTER — OFFICE VISIT (OUTPATIENT)
Dept: PULMONOLOGY | Facility: CLINIC | Age: 69
End: 2024-03-19
Payer: MEDICARE

## 2024-03-19 ENCOUNTER — TELEPHONE (OUTPATIENT)
Dept: PULMONOLOGY | Facility: CLINIC | Age: 69
End: 2024-03-19

## 2024-03-19 VITALS
HEART RATE: 62 BPM | RESPIRATION RATE: 18 BRPM | DIASTOLIC BLOOD PRESSURE: 82 MMHG | TEMPERATURE: 97.7 F | OXYGEN SATURATION: 96 % | WEIGHT: 187.8 LBS | BODY MASS INDEX: 26.95 KG/M2 | SYSTOLIC BLOOD PRESSURE: 141 MMHG

## 2024-03-19 DIAGNOSIS — J84.10 PULMONARY FIBROSIS DETERMINED BY HIGH RESOLUTION COMPUTED TOMOGRAPHY (MULTI): ICD-10-CM

## 2024-03-19 DIAGNOSIS — U09.9 POST COVID-19 CONDITION, UNSPECIFIED: ICD-10-CM

## 2024-03-19 DIAGNOSIS — R05.3 CHRONIC COUGH: Primary | ICD-10-CM

## 2024-03-19 PROCEDURE — 99214 OFFICE O/P EST MOD 30 MIN: CPT | Performed by: INTERNAL MEDICINE

## 2024-03-19 PROCEDURE — 1036F TOBACCO NON-USER: CPT | Performed by: INTERNAL MEDICINE

## 2024-03-19 PROCEDURE — 1159F MED LIST DOCD IN RCRD: CPT | Performed by: INTERNAL MEDICINE

## 2024-03-19 PROCEDURE — 1160F RVW MEDS BY RX/DR IN RCRD: CPT | Performed by: INTERNAL MEDICINE

## 2024-03-19 RX ORDER — PREDNISONE 20 MG/1
TABLET ORAL
Qty: 60 TABLET | Refills: 1 | Status: SHIPPED | OUTPATIENT
Start: 2024-03-19

## 2024-03-19 RX ORDER — ALBUTEROL SULFATE 0.83 MG/ML
2.5 SOLUTION RESPIRATORY (INHALATION) EVERY 6 HOURS PRN
Qty: 300 ML | Refills: 3 | Status: SHIPPED | OUTPATIENT
Start: 2024-03-19 | End: 2025-03-19

## 2024-03-19 RX ORDER — PREDNISONE 20 MG/1
TABLET ORAL
Qty: 60 TABLET | Refills: 0 | Status: SHIPPED | OUTPATIENT
Start: 2024-03-19

## 2024-03-19 ASSESSMENT — ENCOUNTER SYMPTOMS
ABDOMINAL DISTENTION: 0
CHOKING: 1
FACIAL SWELLING: 0
RHINORRHEA: 0
EYE DISCHARGE: 0
FREQUENCY: 0
NERVOUS/ANXIOUS: 0
SINUS PAIN: 0
NUMBNESS: 0
FEVER: 0
COUGH: 1
NAUSEA: 0
JOINT SWELLING: 0
BRUISES/BLEEDS EASILY: 0
FATIGUE: 0
APNEA: 0
UNEXPECTED WEIGHT CHANGE: 0
CONSTIPATION: 0
LIGHT-HEADEDNESS: 0
AGITATION: 0
STRIDOR: 0
HEADACHES: 0
SPEECH DIFFICULTY: 0
SHORTNESS OF BREATH: 1
WHEEZING: 0
SINUS PRESSURE: 0
DYSURIA: 0
HEMATURIA: 0
EYE REDNESS: 0
ABDOMINAL PAIN: 0
WEAKNESS: 0
DIZZINESS: 0
SLEEP DISTURBANCE: 0
PALPITATIONS: 0
DIFFICULTY URINATING: 0
TREMORS: 0
ADENOPATHY: 0
ARTHRALGIAS: 0

## 2024-03-19 NOTE — PROGRESS NOTES
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  His course has been complicated over the last several months with COVID and probable pneumonia with groundglass changes and severe coughing.  He has required higher dose prednisone which was reinitiated last week and currently is on 40 mg and is doing better.  His saturation in the office is 96% and his lungs are clear.  His last CT scan from out a month ago did reveal the groundglass changes.  Probably  it is reasonable to reassess this by a follow-up HRCT scan of the chest.  I am going to taper his prednisone slowly so he does not worsen again.  Probably will have to be on a dose of prednisone over the next 4 to 6 weeks.    PLAN:  I would stay on 40 mg of prednisone (2 tablets) daily over the next week and then keep me posted on your progress.  Will obtain an HRCT scan of the chest and correlate with the findings last month.  Also will order albuterol via nebulizer to use every 4-6 hours as needed.        HISTORY OF PRESENT ILLNESS:  The patient is a 68-year old who seen on July 25, 2023:     I outlined that he had previously been seen on June 1, 2022 with reported history of PVCs and hyperlipidemia with chronic back issues who was been an avid outdoors person with hiking and running 50-60 marathons throughout his lifetime. He has never noted any real dyspnea with his activities out of the ordinary. Recently in [place] he reports after his [employer] COVID vaccination that he noted dyspnea on exertion at altitude with an appropriately elevated heart rate. Even after coming back to [place] he has noted that he is not back to where he should be. He has had some desaturation down to 90% on a recent 11 mile walk in [place]. It was noted that there had been reported cases of myocarditis and  pericarditis after the mRNA COVID-vaccine administration but this is particularly in younger adults and adolescents. .        Review of records outlines the DLCO on April 28, 2022 was normal at 23.11. The FEV1 was 3.24 L and the FEV1 percent was 76%. Also was noted that a work-up in July 2021 with a high altitude stimulation test was normal. It was reported that he had a cardiopulmonary exercise test in December 2021 which revealed no overt cardiac or pulmonary limitation. There was said to be right ventricular strain. He was seen by cardiology and started back on atenolol. He had a positive JOON and was seen by rheumatology and no work-up was thought necessary. His spirometry and DLCO were normal. A CT scan of the chest was said to reveal reticulation suggestive of ILD. Stable nodularity was present up to 5 mm. It is reported that a CT scan from October 19, 2021 revealed reticulation in the periphery of the lungs in the lower lobes bilaterally suggesting ILD. His lungs are clear. His FVC was 4.27 L 99% predicted with an FEV1 of 3.24 L or 98% predicted. On the CPET test he exercised for 7 minutes and 24 seconds and had dyspnea and fatigue. His respiratory quotient was 1.08 and his heart rate reserve was 23%. His blood gas revealed a PO2 of 93.6 mmHg and a PCO2 of 39.7 mmHg with a pH of 7.43. His anaerobic threshold was a 62% of the O2 max. His percent saturation during the test was between 96 to 98%. His maximal oxygen consumption was 23.3 mL/kg/min. The echocardiogram from November 16, 2021 revealed a left ventricular ejection fraction of 60% and the right ventricle was mildly dilated. Right ventricular function was normal. No tricuspid regurgitation was present. A chest x-ray from January 3, 2022 was said to be normal. On a high-altitude simulation test his room air percent saturation was 99% and after 15.1% oxygen it was 97%.     It was outlined that when he was in [place] in July 2021 at 8000 feet he had  shortness of breath with percent saturation dropping into the 70s with a heart rate 120. Cardiac stress test from January 26, 2022 he achieved around 8.8 METS and his heart rate was 160/min 104% predicted.. A Holter monitor from February 25, 2022 revealed frequent sinus tachycardia with rare ventricular ectopy and rare supraventricular ectopy. 1 episode of bigeminy was noted. Collagen vascular labs and ILD labs were all negative except with the JOON being positive at 1-1 60. The DARIAN panel was negative. His D-dimer was normal at 420 and his BNP was less than 50. He did see rheumatology for the JOON positivity and no further evaluation was felt necessary based on the laboratory findings        It was felt based on the review of records that he potentially was developing some type of interstitial lung disorder and he was to complete an MRI of his heart as discussed previously for the question of pericarditis or myocarditis but he needed further CT scanning and this was to be arranged in the fall.    When seen on July 25, 2023 it was reported that he did go off [place] and he was climbing peaks over 14,000 feet and his percent saturations dropped from around 90% to around 75% with exertion. He also had to turn back after period of time on a recent climb and did make it to the [place]. He has no chest pains or pressures. He has no swelling. He does not a lot of physical activities and can hike 18 to 20 miles per day but the stamina is not what it used to be. He can keep up with his wife as much. He has done a lot of woodworking over the years and has been around a lot of campfires etc.    As noted on September 28, 2023 the patient was discussed at the ILD conference felt to have a pattern of probable UIP.  It was felt that he never really demonstrate any desaturation and that his CPETwas going to be obtained.  He was to be followed closely to see if he needed antifibrotic medication    The CPET from October 30, 2023  outlined a maximum consumption of 34.4 mL/kg/min and he had a pulmonary reserve of 33%.  He was felt to have a supranormal exercise capacity and his percent saturation was 98% at maximum VO2.      As I noted on February 19, 2024, the patient came down with COVID on January 24, 2024. He was treated with Paxlovid. He is having coughing particular at night. He did see his primary physician outside of the  system and the chest x-ray was stable with a chronic scarring without pneumonia. It is not unreasonable to put him on some prednisone and if no improvement he will come in to see me. He also had received some amoxicillin.        It was noted on February 24, 2024 he went to the emergency room with coughing that was getting worse.  He reported history of COVID a month ago.  Decreased breath sounds were heard with wheezing and was reported that the CT scan revealed no PE but he had bilateral groundglass changes.    The CT angiogram from February 25, 2024 revealed the following      1. No evidence of pulmonary emboli.  2. Bilateral ground-glass opacities, may be secondary to atelectasis  or pneumonia.  3. Redemonstration of changes of interstitial lung disease. Mild  bronchiectasis.  4. 4 mm pleural-based nodule at the left lower lobe and 7 mm nodular  density along the right major fissure, not significantly changed in  the interval.  5. Cardiomegaly.      I subsequently spoke to him on March 13, 2024 with the events described as outlined above.  He had noted some improvement and then will start doing his regular hiking and had increasing coughing and he stopped and then started on some prednisone.  He was started on 60 mg a day and when I talked to him was down to 40 mg a day.  It was felt that he probably had post-COVID inflammatory changes in his lungs superimposed on his chronic fibrotic changes likely related to probable UIP.   Subsequently over the last 4 days he has been on 40 mg of prednisone a day.  The last  day or so he has been definitely improved and is sleeping through the night.  He has no chest pains or pressures except on the right anteriorly; he has no fevers or chills.  His appetite is okay.  His saturations have been in the 90s.  He is bringing up some clear phlegm.  He has been using albuterol every hour or so because of the coughing.      No Known Allergies         Current Outpatient Medications:     multivitamin tablet, Take 1 tablet by mouth., Disp: , Rfl:     omeprazole (PriLOSEC) 20 mg DR capsule, Take 1 capsule (20 mg) by mouth every other day. Do not crush or chew., Disp: , Rfl:     rosuvastatin (Crestor) 5 mg tablet, Rosuvastatin Calcium 5 MG Oral Tablet  Quantity: 90  Refills: 0      Start : 4-Apr-2022  Active, Disp: , Rfl:     albuterol 2.5 mg /3 mL (0.083 %) nebulizer solution, Take 3 mL (2.5 mg) by nebulization every 6 hours if needed for wheezing., Disp: 300 mL, Rfl: 3    predniSONE (Deltasone) 20 mg tablet, Two PO every day (Patient not taking: Reported on 3/19/2024), Disp: 6 tablet, Rfl: 0    predniSONE (Deltasone) 20 mg tablet, Two PO every day one week then as directed, Disp: 60 tablet, Rfl: 1          Review of Systems   Constitutional:  Negative for fatigue, fever and unexpected weight change.   HENT:  Negative for congestion, facial swelling, nosebleeds, postnasal drip, rhinorrhea, sinus pressure and sinus pain.    Eyes:  Negative for discharge, redness and visual disturbance.   Respiratory:  Positive for cough, choking and shortness of breath. Negative for apnea, wheezing and stridor.    Cardiovascular:  Negative for chest pain, palpitations and leg swelling.   Gastrointestinal:  Negative for abdominal distention, abdominal pain, constipation and nausea.   Endocrine: Negative for cold intolerance and heat intolerance.   Genitourinary:  Negative for difficulty urinating, dysuria, frequency and hematuria.   Musculoskeletal:  Negative for arthralgias, gait problem and joint swelling.    Allergic/Immunologic: Negative for environmental allergies, food allergies and immunocompromised state.   Neurological:  Negative for dizziness, tremors, syncope, speech difficulty, weakness, light-headedness, numbness and headaches.   Hematological:  Negative for adenopathy. Does not bruise/bleed easily.   Psychiatric/Behavioral:  Negative for agitation, behavioral problems and sleep disturbance. The patient is not nervous/anxious.         Vitals:    03/19/24 1230   BP: 141/82   Pulse: 62   Resp: 18   Temp: 36.5 °C (97.7 °F)   SpO2: 96%        Physical Exam  Vitals reviewed.   Constitutional:       Appearance: Normal appearance.   HENT:      Head: Normocephalic and atraumatic.   Eyes:      Extraocular Movements: Extraocular movements intact.   Cardiovascular:      Rate and Rhythm: Normal rate and regular rhythm.      Heart sounds: No murmur heard.     No friction rub. No gallop.   Pulmonary:      Effort: Pulmonary effort is normal. No respiratory distress.      Breath sounds: Normal breath sounds. No stridor. No wheezing, rhonchi or rales.   Chest:      Chest wall: No tenderness.   Abdominal:      General: Abdomen is flat. There is no distension.      Palpations: Abdomen is soft. There is no mass.      Tenderness: There is no abdominal tenderness.   Musculoskeletal:         General: Normal range of motion.      Cervical back: Normal range of motion.      Right lower leg: No edema.      Left lower leg: No edema.   Skin:     General: Skin is warm and dry.   Neurological:      Mental Status: Arya Weiss is alert and oriented to person, place, and time.   Psychiatric:         Mood and Affect: Mood normal.         Behavior: Behavior normal.

## 2024-03-26 ENCOUNTER — HOSPITAL ENCOUNTER (OUTPATIENT)
Dept: RADIOLOGY | Facility: HOSPITAL | Age: 69
Discharge: HOME | End: 2024-03-26
Payer: MEDICARE

## 2024-03-26 ENCOUNTER — DOCUMENTATION (OUTPATIENT)
Dept: PULMONOLOGY | Facility: HOSPITAL | Age: 69
End: 2024-03-26
Payer: MEDICARE

## 2024-03-26 DIAGNOSIS — U09.9 POST COVID-19 CONDITION, UNSPECIFIED: ICD-10-CM

## 2024-03-26 DIAGNOSIS — R05.3 CHRONIC COUGH: ICD-10-CM

## 2024-03-26 DIAGNOSIS — J84.10 PULMONARY FIBROSIS DETERMINED BY HIGH RESOLUTION COMPUTED TOMOGRAPHY (MULTI): Primary | ICD-10-CM

## 2024-03-26 DIAGNOSIS — J84.10 PULMONARY FIBROSIS DETERMINED BY HIGH RESOLUTION COMPUTED TOMOGRAPHY (MULTI): ICD-10-CM

## 2024-03-26 PROCEDURE — 71250 CT THORAX DX C-: CPT | Performed by: STUDENT IN AN ORGANIZED HEALTH CARE EDUCATION/TRAINING PROGRAM

## 2024-03-26 PROCEDURE — 71250 CT THORAX DX C-: CPT

## 2024-03-26 RX ORDER — SULFAMETHOXAZOLE AND TRIMETHOPRIM 800; 160 MG/1; MG/1
TABLET ORAL
Qty: 12 TABLET | Refills: 1 | Status: SHIPPED | OUTPATIENT
Start: 2024-03-26

## 2024-03-26 NOTE — PROGRESS NOTES
CT scan is better but still has superimposed groundglass changes on his UIP picture.  He will continue on prednisone currently on 40 mg daily which will be cut down to 30 mg a day in 1 week.  I will call in Bactrim prophylaxis 3 times a week and come back and see me in 1 month.

## 2024-04-04 ENCOUNTER — MULTIDISCIPLINARY MEETING (OUTPATIENT)
Dept: PULMONOLOGY | Facility: HOSPITAL | Age: 69
End: 2024-04-04

## 2024-04-05 DIAGNOSIS — J84.10 PULMONARY FIBROSIS DETERMINED BY HIGH RESOLUTION COMPUTED TOMOGRAPHY (MULTI): Primary | ICD-10-CM

## 2024-04-05 NOTE — PROGRESS NOTES
Patient discussed at the ILD conference and everybody concurs that he likely either had a flare of his underlying UIP or changes related to COVID.  In any case we are tapering the prednisone and is doing much better.  He is on 30 mg of prednisone and will stay on that for 1 more week then down to 20 mg for several weeks.  Will get PFTs and will continue to taper.  Will check labs because of some cramps he is having.

## 2024-04-06 ENCOUNTER — LAB (OUTPATIENT)
Dept: LAB | Facility: LAB | Age: 69
End: 2024-04-06
Payer: MEDICARE

## 2024-04-06 DIAGNOSIS — J84.10 PULMONARY FIBROSIS DETERMINED BY HIGH RESOLUTION COMPUTED TOMOGRAPHY (MULTI): ICD-10-CM

## 2024-04-06 LAB
ALBUMIN SERPL BCP-MCNC: 4.1 G/DL (ref 3.4–5)
ALP SERPL-CCNC: 65 U/L (ref 33–136)
ALT SERPL W P-5'-P-CCNC: 29 U/L (ref 7–52)
ANION GAP SERPL CALC-SCNC: 12 MMOL/L (ref 10–20)
AST SERPL W P-5'-P-CCNC: 15 U/L (ref 9–39)
BILIRUB SERPL-MCNC: 0.5 MG/DL (ref 0–1.2)
BUN SERPL-MCNC: 12 MG/DL (ref 6–23)
CALCIUM SERPL-MCNC: 9.1 MG/DL (ref 8.6–10.3)
CHLORIDE SERPL-SCNC: 102 MMOL/L (ref 98–107)
CO2 SERPL-SCNC: 29 MMOL/L (ref 21–32)
CREAT SERPL-MCNC: 0.91 MG/DL (ref 0.5–1.3)
EGFRCR SERPLBLD CKD-EPI 2021: >90 ML/MIN/1.73M*2
ERYTHROCYTE [DISTWIDTH] IN BLOOD BY AUTOMATED COUNT: 12.4 % (ref 11.5–14.5)
GLUCOSE SERPL-MCNC: 175 MG/DL (ref 74–99)
HCT VFR BLD AUTO: 44.2 % (ref 36–52)
HGB BLD-MCNC: 15.1 G/DL (ref 12–17.5)
MCH RBC QN AUTO: 31.5 PG (ref 26–34)
MCHC RBC AUTO-ENTMCNC: 34.2 G/DL (ref 32–36)
MCV RBC AUTO: 92 FL (ref 80–100)
NRBC BLD-RTO: 0 /100 WBCS (ref 0–0)
PLATELET # BLD AUTO: 234 X10*3/UL (ref 150–450)
POTASSIUM SERPL-SCNC: 4.2 MMOL/L (ref 3.5–5.3)
PROT SERPL-MCNC: 6.5 G/DL (ref 6.4–8.2)
RBC # BLD AUTO: 4.8 X10*6/UL (ref 4–5.9)
SODIUM SERPL-SCNC: 139 MMOL/L (ref 136–145)
WBC # BLD AUTO: 10.1 X10*3/UL (ref 4.4–11.3)

## 2024-04-06 PROCEDURE — 80053 COMPREHEN METABOLIC PANEL: CPT

## 2024-04-06 PROCEDURE — 85027 COMPLETE CBC AUTOMATED: CPT

## 2024-04-06 PROCEDURE — 36415 COLL VENOUS BLD VENIPUNCTURE: CPT

## 2024-04-16 ENCOUNTER — TELEPHONE (OUTPATIENT)
Dept: PULMONOLOGY | Facility: CLINIC | Age: 69
End: 2024-04-16
Payer: MEDICARE

## 2024-04-16 NOTE — TELEPHONE ENCOUNTER
Patient will have his PFT done on April 24th and wants to know if he needs to follow up with you in the office or will you call him to discuss the results and next steps

## 2024-04-24 ENCOUNTER — HOSPITAL ENCOUNTER (OUTPATIENT)
Dept: RESPIRATORY THERAPY | Facility: HOSPITAL | Age: 69
Discharge: HOME | End: 2024-04-24
Payer: MEDICARE

## 2024-04-24 ENCOUNTER — APPOINTMENT (OUTPATIENT)
Dept: RESPIRATORY THERAPY | Facility: CLINIC | Age: 69
End: 2024-04-24
Payer: MEDICARE

## 2024-04-24 ENCOUNTER — APPOINTMENT (OUTPATIENT)
Dept: RESPIRATORY THERAPY | Facility: HOSPITAL | Age: 69
End: 2024-04-24
Payer: MEDICARE

## 2024-04-24 DIAGNOSIS — J84.10 PULMONARY FIBROSIS DETERMINED BY HIGH RESOLUTION COMPUTED TOMOGRAPHY (MULTI): ICD-10-CM

## 2024-04-24 LAB
MGC ASCENT PFT - FEV1 - PRE: 3.24
MGC ASCENT PFT - FEV1 - PRE: 3.24
MGC ASCENT PFT - FEV1 - PREDICTED: 3.13
MGC ASCENT PFT - FEV1 - PREDICTED: 3.13
MGC ASCENT PFT - FVC - PRE: 4.03
MGC ASCENT PFT - FVC - PRE: 4.03
MGC ASCENT PFT - FVC - PREDICTED: 4.11
MGC ASCENT PFT - FVC - PREDICTED: 4.11

## 2024-04-24 PROCEDURE — 94726 PLETHYSMOGRAPHY LUNG VOLUMES: CPT

## 2024-04-24 PROCEDURE — 94618 PULMONARY STRESS TESTING: CPT

## 2024-04-25 ENCOUNTER — DOCUMENTATION (OUTPATIENT)
Dept: PULMONOLOGY | Facility: HOSPITAL | Age: 69
End: 2024-04-25
Payer: MEDICARE

## 2024-05-29 NOTE — PROGRESS NOTES
Interstitial Lung Disease Multidisciplinary Conference    Patient Info:  Arya Weiss  [id number]  1955 69 y.o.   Sex: Male     MDD Discussion Date: 4/4/24    Presenter: Dr. Soto    Clinical History: Previously discussed 9/28/23 - probable UIP, Plan for CPET and to continue to monitor. 10/2023 - CPET - O2 max 34.4 ml/kg/min 98%, respiratory reserve 33%. 1/2024 COVID - got paxlovid and felt better. Then in 2/2024 had worsening symptoms presented to [facility] - [facility] on CT. Started on prednisone and was doing better.   Smoking - never   PFTs - no obstruction   Exposures- woodworking in the past   CTD -   Echo- EF 60-65%     Radiological Diagnosis:  6/2023 - - subpleural reticulations- lower lung predominant.  2/2024- subpleural reticulations- lower lung predominant. Bronchiectasis and bronchiolectasis. Areas of GGO.    3/26/24 - subpleural reticulations- lower lung predominant. Bronchiectasis and bronchiolectasis. Improvement in GGO from 2/2024. Probable UIP with improving GGO  Pathologic Diagnosis: none    MDD Consensus Diagnosis: Probable UIP - with acute changes related to COVID     Recommendation and Management Plan:  Slow taper of steroids   Continue to monitor PFTs and CT    Can consider referral to start evaluation for lung transplant.          Disclaimer     ILD multidisciplinary committee recommendations represent the consensus opinion of physicians present at a weekly patient care conference. The treating physician is not always present, and many of the physicians formulating the recommendation have not personally seen or examined the patient under discussion. It is understood that the treating physician considers the expertise of the ILD multidisciplinary committee in formulating his/her plan for the patient. However, in many situations, based on individualized patient considerations, a different plan is determined by the treating physician to be the optimal medical management.    Notes from discussion taken  by Nara CYR-JESUS and recommendations discussed with Dr. Garrison Yang.